# Patient Record
Sex: FEMALE | Race: WHITE | NOT HISPANIC OR LATINO | Employment: FULL TIME | URBAN - METROPOLITAN AREA
[De-identification: names, ages, dates, MRNs, and addresses within clinical notes are randomized per-mention and may not be internally consistent; named-entity substitution may affect disease eponyms.]

---

## 2018-01-18 NOTE — MISCELLANEOUS
Message  Return to work or school:   Blanco Fernández is under my professional care  She was seen in my office on 12/07/2016   She is able to return to work on  12/09/2016       Aletha Resendez PA-C        Signatures   Electronically signed by : Parul Anthony, [facility]; Dec  7 2016  6:25PM EST                       (Author)    Electronically signed by : Parul Anthony, [facility]; Dec  7 2016  6:26PM EST                       (Author)

## 2018-08-13 ENCOUNTER — TELEPHONE (OUTPATIENT)
Dept: FAMILY MEDICINE CLINIC | Facility: CLINIC | Age: 28
End: 2018-08-13

## 2018-08-13 NOTE — TELEPHONE ENCOUNTER
Patient called she needs presp for valacyclovir 500 mg called into [facility] [phone]  Fax [phone] address [address] [place] [address]   TY

## 2018-08-13 NOTE — TELEPHONE ENCOUNTER
Called pt lm to call office back regarding her medication request for Valacyclovir 500mg Ana wants to know Has she had this in the past? What outbreak type ?

## 2018-08-14 ENCOUNTER — TELEPHONE (OUTPATIENT)
Dept: FAMILY MEDICINE CLINIC | Facility: CLINIC | Age: 28
End: 2018-08-14

## 2018-08-14 DIAGNOSIS — A60.09 HERPES GENITALIS IN WOMEN: Primary | ICD-10-CM

## 2018-08-14 RX ORDER — VALACYCLOVIR HYDROCHLORIDE 500 MG/1
500 TABLET, FILM COATED ORAL 2 TIMES DAILY
Qty: 6 TABLET | Refills: 2 | Status: SHIPPED | OUTPATIENT
Start: 2018-08-14 | End: 2018-08-17

## 2018-08-14 RX ORDER — VALACYCLOVIR HYDROCHLORIDE 500 MG/1
500 TABLET, FILM COATED ORAL 2 TIMES DAILY
COMMUNITY
Start: 2018-05-21 | End: 2018-08-14 | Stop reason: SDUPTHER

## 2018-08-14 NOTE — TELEPHONE ENCOUNTER
Patient called back in regards valacyclovir 500 mg she said that she was subscribed this before and it is for herpes out break she gave presp # [id number] she would like It called into [facility] at [phone]  Fax [phone]  Address [address] [address] and she would like a call if it is going to be called in for her she can be reached at [phone]  TY

## 2023-03-02 ENCOUNTER — OFFICE VISIT (OUTPATIENT)
Dept: URGENT CARE | Facility: CLINIC | Age: 33
End: 2023-03-02

## 2023-03-02 VITALS — DIASTOLIC BLOOD PRESSURE: 85 MMHG | OXYGEN SATURATION: 99 % | HEART RATE: 80 BPM | SYSTOLIC BLOOD PRESSURE: 122 MMHG

## 2023-03-02 DIAGNOSIS — H10.32 ACUTE BACTERIAL CONJUNCTIVITIS OF LEFT EYE: Primary | ICD-10-CM

## 2023-03-02 RX ORDER — POLYMYXIN B SULFATE AND TRIMETHOPRIM 1; 10000 MG/ML; [USP'U]/ML
1 SOLUTION OPHTHALMIC EVERY 4 HOURS
Qty: 10 ML | Refills: 0 | Status: SHIPPED | OUTPATIENT
Start: 2023-03-02

## 2023-03-02 RX ORDER — POLYMYXIN B SULFATE AND TRIMETHOPRIM 1; 10000 MG/ML; [USP'U]/ML
1 SOLUTION OPHTHALMIC EVERY 4 HOURS
Qty: 10 ML | Refills: 0 | Status: SHIPPED | OUTPATIENT
Start: 2023-03-02 | End: 2023-03-02

## 2023-03-02 NOTE — PROGRESS NOTES
[address] [facility]        NAME: Esdras Hooks is a 34 y o  female  : 1990    MRN: [id number]  DATE: 2023  TIME: 2:40 PM    Assessment and Plan   Acute bacterial conjunctivitis of left eye [H10 32]  1  Acute bacterial conjunctivitis of left eye  polymyxin b-trimethoprim (POLYTRIM) ophthalmic solution            Patient Instructions     Ophthalmic eye abx given today  Explained contagious nature of pink eye  Avoid rubbing eye and wash hands frequently  Follow-up with PCP in the next 3-5 days if no improvement  Go to the ED if symptoms severely worsen  Chief Complaint   No chief complaint on file  History of Present Illness     Esdras Hooks is a 34 y o  female presenting to the office today for eye itching  Symptoms have been present for 1 days, and include eye redness and discharge  Sick contacts include: daughter with pink eye    Review of Systems     Review of Systems   Constitutional: Negative for chills and fever  HENT: Negative for congestion, facial swelling, postnasal drip, sinus pressure, sinus pain and sore throat  Eyes: Positive for discharge, redness and itching  Negative for pain  Respiratory: Negative for cough and shortness of breath  Skin: Negative for rash and wound  Allergic/Immunologic: Negative for environmental allergies  Neurological: Negative for dizziness, facial asymmetry, light-headedness and headaches  Psychiatric/Behavioral: Negative for agitation  The patient is not nervous/anxious          Current Medications       Current Outpatient Medications:   •  polymyxin b-trimethoprim (POLYTRIM) ophthalmic solution, Apply 1 drop to eye every 4 (four) hours, Disp: 10 mL, Rfl: 0  •  valACYclovir (VALTREX) 500 mg tablet, Take 1 tablet (500 mg total) by mouth 2 (two) times a day for 3 days For 3 days, Disp: 6 tablet, Rfl: 2    Current Allergies     Allergies as of 2023   • (Not on File)            The following portions of the patient's history were reviewed and updated as appropriate: allergies, current medications, past family history, past medical history, past social history, past surgical history and problem list      No past medical history on file  No past surgical history on file  Family History   Problem Relation Age of Onset   • Mitral valve prolapse Mother    • Mitral valve prolapse Maternal Aunt        Medications have been verified  Objective     /85   Pulse 80   SpO2 99%   No LMP recorded  Physical Exam     Physical Exam  Vitals reviewed  Constitutional:       General: She is not in acute distress  Appearance: Normal appearance  She is not ill-appearing  HENT:      Head: Normocephalic and atraumatic  Eyes:      General: Lids are normal  Vision grossly intact  Right eye: No discharge or hordeolum  Left eye: Discharge present  No hordeolum  Extraocular Movements: Extraocular movements intact  Conjunctiva/sclera:      Right eye: No exudate  Left eye: Left conjunctiva is injected  No exudate  Pulmonary:      Effort: Pulmonary effort is normal  No respiratory distress  Musculoskeletal:      Cervical back: Normal range of motion and neck supple  No tenderness  Skin:     General: Skin is warm  Neurological:      General: No focal deficit present  Mental Status: She is alert     Psychiatric:         Mood and Affect: Mood normal          Behavior: Behavior normal